# Patient Record
Sex: MALE | Race: WHITE | NOT HISPANIC OR LATINO | Employment: FULL TIME | URBAN - METROPOLITAN AREA
[De-identification: names, ages, dates, MRNs, and addresses within clinical notes are randomized per-mention and may not be internally consistent; named-entity substitution may affect disease eponyms.]

---

## 2017-08-10 ENCOUNTER — TRANSCRIBE ORDERS (OUTPATIENT)
Dept: SLEEP CENTER | Facility: CLINIC | Age: 57
End: 2017-08-10

## 2017-08-10 ENCOUNTER — HOSPITAL ENCOUNTER (OUTPATIENT)
Dept: SLEEP CENTER | Facility: CLINIC | Age: 57
Discharge: HOME | End: 2017-08-10
Payer: COMMERCIAL

## 2017-08-10 DIAGNOSIS — G47.33 OSA (OBSTRUCTIVE SLEEP APNEA): Primary | ICD-10-CM

## 2017-08-10 DIAGNOSIS — G47.33 OSA (OBSTRUCTIVE SLEEP APNEA): ICD-10-CM

## 2018-08-14 ENCOUNTER — OFFICE VISIT (OUTPATIENT)
Dept: SLEEP CENTER | Facility: CLINIC | Age: 58
End: 2018-08-14
Payer: COMMERCIAL

## 2018-08-14 VITALS
BODY MASS INDEX: 38.51 KG/M2 | HEART RATE: 72 BPM | HEIGHT: 70 IN | SYSTOLIC BLOOD PRESSURE: 120 MMHG | DIASTOLIC BLOOD PRESSURE: 82 MMHG | WEIGHT: 269 LBS

## 2018-08-14 DIAGNOSIS — G47.33 OSA (OBSTRUCTIVE SLEEP APNEA): ICD-10-CM

## 2018-08-14 PROCEDURE — 99214 OFFICE O/P EST MOD 30 MIN: CPT | Performed by: INTERNAL MEDICINE

## 2018-08-14 RX ORDER — LOSARTAN POTASSIUM 50 MG/1
100 TABLET ORAL ONCE
COMMUNITY
Start: 2018-07-15

## 2018-08-14 RX ORDER — OMEPRAZOLE 40 MG/1
CAPSULE, DELAYED RELEASE ORAL
Refills: 3 | COMMUNITY
Start: 2018-07-23

## 2018-08-14 RX ORDER — BUPROPION HYDROCHLORIDE 300 MG/1
TABLET ORAL
COMMUNITY

## 2018-08-14 RX ORDER — VENLAFAXINE HYDROCHLORIDE 75 MG/1
CAPSULE, EXTENDED RELEASE ORAL
COMMUNITY

## 2018-08-14 RX ORDER — VENLAFAXINE HYDROCHLORIDE 37.5 MG/1
CAPSULE, EXTENDED RELEASE ORAL
COMMUNITY
Start: 2018-01-15

## 2018-08-14 RX ORDER — CABERGOLINE 0.5 MG/1
TABLET ORAL
COMMUNITY

## 2018-08-14 NOTE — PROGRESS NOTES
Progress Note - Sleep Center   Helen Rose IVW:[birth year] MRN: [id number]      Reason for Visit:  59 y o male here for annual follow-up    Assessment:  Doing well on current therapy of BiPAP 18/14 cm for very severe MARINA (AHI = 85)  The patient has been snoring on his current setting and is machine is not functioning properly  His humidifier does not work at all  Plan:  Retitration study, followed by new BiPAP prescription for a new machine  Follow up:  Compliance check    History of Present Illness:  History of MARINA on PAP therapy  Fully compliant and deriving benefit  Historical Information    Past Medical History: History reviewed  No pertinent past medical history  Past Surgical History: History reviewed  No pertinent surgical history  Social History:   Social History     Social History    Marital status: /Civil Union     Spouse name: N/A    Number of children: N/A    Years of education: N/A     Social History Main Topics    Smoking status: Never Smoker    Smokeless tobacco: Never Used    Alcohol use Yes    Drug use: No    Sexual activity: Not Asked     Other Topics Concern    None     Social History Narrative    None       Family History: History reviewed  No pertinent family history      Medications/Allergies:      Current Outpatient Prescriptions:     buPROPion (WELLBUTRIN XL) 300 mg 24 hr tablet, Take by mouth, Disp: , Rfl:     cabergoline (DOSTINEX) 0 5 MG tablet, cabergoline 0 5 mg tablet, Disp: , Rfl:     losartan (COZAAR) 50 mg tablet, , Disp: , Rfl:     omeprazole (PriLOSEC) 40 MG capsule, TAKE 1 CAPSULE BY MOUTH EVERY DAY, 30 MINUTES BEFORE EATING/DRINKING IN MORNING, Disp: , Rfl: 3    venlafaxine (EFFEXOR-XR) 37 5 mg 24 hr capsule, , Disp: , Rfl:     venlafaxine (EFFEXOR-XR) 75 mg 24 hr capsule, venlafaxine ER 75 mg capsule,extended release 24 hr, Disp: , Rfl:         Review of Systems      Genitourinary difficulty with erection   Cardiology none Gastrointestinal none   Neurology numbness/tingling of an extremity and balance problems   Constitutional weight change   Integumentary none   Psychiatry aggressiveness or irritability   Musculoskeletal none   Pulmonary frequent cough and snoring   ENT throat clearing   Endocrine excessive thirst and frequent urination   Hematological none           Objective      Vital Signs:   Vitals:    08/14/18 0800   BP: 120/82   Pulse: 72     [place] Sleepiness Scale: Total score: 7        Physical Exam:    General: Alert, appropriate, cooperative, overweight    Head: NC/AT    Skin: Warm, dry    Neuro: No motor abnormalities, cranial nerves appear intact    Extremity: No clubbing, cyanosis      DME Provider: [facility]    Counseling / Coordination of Care  Total clinic time spent today 15 minutes  Greater than 50% of total time was spent with the patient and / or family counseling and / or coordination of care  A description of the counseling / coordination of care: Discussed equipment and response to treatment  DANIAL Jarvis    Board Certified Sleep Specialist

## 2018-09-14 ENCOUNTER — HOSPITAL ENCOUNTER (OUTPATIENT)
Dept: SLEEP CENTER | Facility: CLINIC | Age: 58
Discharge: HOME | End: 2018-09-14
Payer: COMMERCIAL

## 2018-09-14 DIAGNOSIS — G47.33 OSA (OBSTRUCTIVE SLEEP APNEA): ICD-10-CM

## 2018-09-14 PROCEDURE — 95811 POLYSOM 6/>YRS CPAP 4/> PARM: CPT

## 2018-09-15 NOTE — PROGRESS NOTES
Sleep Study Documentation    Pre-Sleep Study       Sleep testing procedure explained to patient:YES    Patient napped prior to study:YES- less than 30 minutes  Napped after 2PM: yes    Caffeine:Dayshift worker after 12PM   Caffeine use:NO    Alcohol:Dayshift workers after 5PM: Alcohol use:NO    Typical day for patient:YES       Study Documentation  Treatment   Optimal PAP pressure: 20/93XHW1F  Leak:Small  Snore:Eliminated  REM Obtained:yes  Supplemental O2: no    Minimum SaO2 88%  Baseline SaO2 97%  PAP mask tried (list all)[employer]AR[employer] DREAM WEAR  PAP mask choice (final)LAR[employer] DREAM WEAR  PAP mask type:full face  PAP pressure at which snoring was eliminated 16/85HMO2K  Minimum SaO2 at final PAP pressure 95%  Mode of Therapy:BiPAP  ETCO2:Yes  If yes comment on data AS PER ORDER  CPAP changed to BiPAP:No    Mode of Therapy:BiPAP    EKG abnormalities: no     EEG abnormalities: no    Study Terminated:no    Patient classification: employed       Post-Sleep Study    Medication used at bedtime or during sleep study:NO    Patient reports time it took to fall asleep:less than 20 minutes    Patient reports waking up during study:3 or more times  Patient reports returning to sleep without difficulty  Patient reports sleeping 4 to 6 hours without dreaming  Patient reports sleep during study:typical    Patient rated sleepiness: Somewhat sleepy or tired    PAP treatment:yes: Post PAP treatment patient reports feeling unchanged and would wear PAP mask at home

## 2018-09-17 DIAGNOSIS — G47.33 OSA (OBSTRUCTIVE SLEEP APNEA): Primary | ICD-10-CM

## 2018-09-20 ENCOUNTER — TELEPHONE (OUTPATIENT)
Dept: SLEEP CENTER | Facility: CLINIC | Age: 58
End: 2018-09-20

## 2018-09-20 NOTE — TELEPHONE ENCOUNTER
Spoke with patient regarding BiPap pressure of 20/16  Patient needs new machine and uses [facility] as his provider  Rx and data to [facility]   They will call and schedule set up once approved by insurance  Patient will call if any problems

## 2018-09-28 DIAGNOSIS — G47.33 OSA (OBSTRUCTIVE SLEEP APNEA): Primary | ICD-10-CM

## 2018-10-12 DIAGNOSIS — G47.33 OSA (OBSTRUCTIVE SLEEP APNEA): Primary | ICD-10-CM

## 2018-10-15 ENCOUNTER — TELEPHONE (OUTPATIENT)
Dept: SLEEP CENTER | Facility: CLINIC | Age: 58
End: 2018-10-15

## 2018-10-15 NOTE — TELEPHONE ENCOUNTER
I spoke with pt, he states he did not stop his BIPAP prior to his home study scheduled for [facility]  I advised that he will need to stop BIPAP for 3 days prior to home study  Pt states he did have a diagnostic sleep study done in 1998 at [facility]  I advised if we could get that report that we might not need him to get a home study  Advised I will try to get the report and CB  I also sent a message to Dr Bere Kyle to see if he still wants him to get a home sleep study if we can get his report  Will await his response

## 2018-10-16 NOTE — TELEPHONE ENCOUNTER
DR Oscar Clifton aware of study from 2000  Ok to try to get authorization for new machine  Report given to [facility]     I spoke with pt, made aware that we have report, will be sent to insurance to see if we can get auth, as he might not need another sleep study  Advised will keep him updated  Pt thankful

## 2018-10-22 ENCOUNTER — TRANSCRIBE ORDERS (OUTPATIENT)
Dept: SLEEP CENTER | Facility: CLINIC | Age: 58
End: 2018-10-22

## 2018-12-23 ENCOUNTER — HOSPITAL ENCOUNTER (EMERGENCY)
Facility: HOSPITAL | Age: 58
Discharge: HOME | End: 2018-12-23
Attending: EMERGENCY MEDICINE | Admitting: EMERGENCY MEDICINE
Payer: COMMERCIAL

## 2018-12-23 VITALS
WEIGHT: 265 LBS | DIASTOLIC BLOOD PRESSURE: 86 MMHG | SYSTOLIC BLOOD PRESSURE: 147 MMHG | HEART RATE: 73 BPM | RESPIRATION RATE: 18 BRPM | BODY MASS INDEX: 38.02 KG/M2 | OXYGEN SATURATION: 96 % | TEMPERATURE: 98.1 F

## 2018-12-23 DIAGNOSIS — S61.219A FINGER LACERATION: Primary | ICD-10-CM

## 2018-12-23 PROCEDURE — 90715 TDAP VACCINE 7 YRS/> IM: CPT | Performed by: EMERGENCY MEDICINE

## 2018-12-23 PROCEDURE — 99283 EMERGENCY DEPT VISIT LOW MDM: CPT

## 2018-12-23 PROCEDURE — 90471 IMMUNIZATION ADMIN: CPT

## 2018-12-23 RX ADMIN — TETANUS TOXOID, REDUCED DIPHTHERIA TOXOID AND ACELLULAR PERTUSSIS VACCINE, ADSORBED 0.5 ML: 5; 2.5; 8; 8; 2.5 SUSPENSION INTRAMUSCULAR at 11:32

## 2018-12-23 NOTE — DISCHARGE INSTRUCTIONS
Finger Laceration   WHAT YOU NEED TO KNOW:   A finger laceration is a deep cut in your skin  It is often caused by a sharp object, such as a knife, or blunt force to your finger  Your blood vessels, bones, joints, tendons, or nerves may also be injured  DISCHARGE INSTRUCTIONS:   Return to the emergency department if:   · Your wound comes apart  · Blood soaks through your bandage  · You have severe pain in your finger or hand  · Your finger is pale and cold  · You have sudden trouble moving your finger  · Your swelling suddenly gets worse  · You have red streaks on your skin coming from your wound  Contact your healthcare provider or hand specialist if:   · You have new numbness or tingling  · Your finger feels warm, looks swollen or red, and is draining pus  · You have a fever  · You have questions or concerns about your condition or care  Medicines: You may  need any of the following:  · Antibiotics  help prevent a bacterial infection  · Acetaminophen  decreases pain and fever  It is available without a doctor's order  Ask how much to take and how often to take it  Follow directions  Read the labels of all other medicines you are using to see if they also contain acetaminophen, or ask your doctor or pharmacist  Acetaminophen can cause liver damage if not taken correctly  Do not use more than 4 grams (4,000 milligrams) total of acetaminophen in one day  · Prescription pain medicine  may be given  Ask your healthcare provider how to take this medicine safely  Some prescription pain medicines contain acetaminophen  Do not take other medicines that contain acetaminophen without talking to your healthcare provider  Too much acetaminophen may cause liver damage  Prescription pain medicine may cause constipation  Ask your healthcare provider how to prevent or treat constipation  · Take your medicine as directed    Contact your healthcare provider if you think your medicine is not helping or if you have side effects  Tell him or her if you are allergic to any medicine  Keep a list of the medicines, vitamins, and herbs you take  Include the amounts, and when and why you take them  Bring the list or the pill bottles to follow-up visits  Carry your medicine list with you in case of an emergency  Self-care:   · Apply ice  on your finger for 15 to 20 minutes every hour or as directed  Use an ice pack, or put crushed ice in a plastic bag  Cover it with a towel before you apply it to your skin  Ice helps prevent tissue damage and decreases swelling and pain  · Elevate  your hand above the level of your heart as often as you can  This will help decrease swelling and pain  Prop your hand on pillows or blankets to keep it elevated comfortably  · Wear your splint as directed  A splint will decrease movement and stress on your wound  The splint may help your wound heal faster  Ask your healthcare provider how to apply and remove a splint  · Apply ointments to decrease scarring  Do not apply ointments until your healthcare provider says it is okay  You may need to wait until your wound is healed  Ask which ointment to buy and how often to use it  Wound care:   · Do not get your wound wet until your healthcare provider says it is okay  Do not soak your hand in water  Do not go swimming until your healthcare provider says it is okay  When your healthcare provider says it is okay, carefully wash around the wound with soap and water  Let soap and water run over your wound  Gently pat the area dry or allow it to air dry  · Change your bandages when they get wet, dirty, or after washing  Apply new, clean bandages as directed  Do not apply elastic bandages or tape too tightly  Do not put powders or lotions on your wound  · Apply antibiotic ointment as directed  Your healthcare provider may give you antibiotic ointment to put over your wound if you have stitches   If you have Strips-Strips over your wound, let them dry up and fall off on their own  If they do not fall off within 14 days, gently remove them  If you have glue over your wound, do not remove or pick at it  If your glue comes off, do not replace it with glue that you have at home  · Check your wound every day for signs of infection  Signs of infection include swelling, redness, or pus  Follow up with your healthcare provider or hand specialist in 2 days:  Write down your questions so you remember to ask them during your visits  © 2017 [employer]  Information is for End User's use only and may not be sold, redistributed or otherwise used for commercial purposes  All illustrations and images included in CareNotes® are the copyrighted property of [employer]  or [employer] Santo  The above information is an  only  It is not intended as medical advice for individual conditions or treatments  Talk to your doctor, nurse or pharmacist before following any medical regimen to see if it is safe and effective for you

## 2018-12-23 NOTE — ED PROVIDER NOTES
History  Chief Complaint   Patient presents with    Finger Laceration     pt reports trying to open a heavy zip tie with a utility knife and has lac to left 2nd digit  not UTD on tetanus  13-year-old male presents today after sustaining a laceration to his left index finger while trying to cut a zip tie with a utility knife  Denies other injury  Is not on any blood thinners  History provided by:  Patient  Hand Injury   Location:  Finger  Finger location:  L index finger  Injury: no    Pain details:     Severity:  No pain  Handedness:  Right-handed  Dislocation: no    Foreign body present:  No foreign bodies  Tetanus status:  Out of date  Prior injury to area:  No  Relieved by:  None tried  Ineffective treatments:  None tried  Associated symptoms: no fever    Risk factors: no concern for non-accidental trauma and no recent illness        Prior to Admission Medications   Prescriptions Last Dose Informant Patient Reported? Taking? buPROPion (WELLBUTRIN XL) 300 mg 24 hr tablet   Yes No   Sig: Take by mouth   cabergoline (DOSTINEX) 0 5 MG tablet   Yes No   Sig: cabergoline 0 5 mg tablet   losartan (COZAAR) 50 mg tablet   Yes No   omeprazole (PriLOSEC) 40 MG capsule   Yes No   Sig: TAKE 1 CAPSULE BY MOUTH EVERY DAY, 30 MINUTES BEFORE EATING/DRINKING IN MORNING   venlafaxine (EFFEXOR-XR) 37 5 mg 24 hr capsule   Yes No   venlafaxine (EFFEXOR-XR) 75 mg 24 hr capsule   Yes No   Sig: venlafaxine ER 75 mg capsule,extended release 24 hr      Facility-Administered Medications: None       Past Medical History:   Diagnosis Date    GERD (gastroesophageal reflux disease)     Hypertension     Psychiatric disorder     Sleep apnea        Past Surgical History:   Procedure Laterality Date    CATARACT EXTRACTION      RETINAL DETACHMENT SURGERY         History reviewed  No pertinent family history  I have reviewed and agree with the history as documented      Social History   Substance Use Topics    Smoking status: Never Smoker    Smokeless tobacco: Never Used    Alcohol use Yes        Review of Systems   Constitutional: Negative for chills and fever  Skin: Positive for wound  Negative for pallor and rash  Allergic/Immunologic: Negative for immunocompromised state  Hematological: Does not bruise/bleed easily  Psychiatric/Behavioral: Negative for confusion  Physical Exam  Physical Exam   Constitutional: He is oriented to person, place, and time  He appears well-developed and well-nourished  HENT:   Head: Normocephalic and atraumatic  Musculoskeletal: Normal range of motion  Hands:  Small 1 cm lac on the dorsal left MCP  Shallow  No tendon involvement  NV intact distally  Neurological: He is alert and oriented to person, place, and time  Skin: Skin is warm and dry  Capillary refill takes less than 2 seconds  Psychiatric: He has a normal mood and affect  Nursing note and vitals reviewed  Vital Signs  ED Triage Vitals [12/23/18 1108]   Temperature Pulse Respirations Blood Pressure SpO2   98 1 °F (36 7 °C) 73 18 147/86 96 %      Temp Source Heart Rate Source Patient Position - Orthostatic VS BP Location FiO2 (%)   Oral Monitor -- -- --      Pain Score       2           Vitals:    12/23/18 1108   BP: 147/86   Pulse: 73       Visual Acuity      ED Medications  Medications   tetanus-diphtheria-acellular pertussis (BOOSTRIX) IM injection 0 5 mL (0 5 mL Intramuscular Given 12/23/18 1132)       Diagnostic Studies  Results Reviewed     None                 No orders to display              Procedures  Lac Repair  Date/Time: 12/23/2018 1:03 PM  Performed by: Josefina Garrido by: Jen Kwon   Consent: Verbal consent obtained    Patient understanding: patient states understanding of the procedure being performed  Required items: required blood products, implants, devices, and special equipment available  Patient identity confirmed: verbally with patient  Body area: upper extremity  Location details: left index finger  Laceration length: 1 cm  Foreign bodies: no foreign bodies  Tendon involvement: none  Nerve involvement: none  Vascular damage: no    Sedation:  Patient sedated: no    Wound Dehiscence:  Superficial Wound Dehiscence: simple closure      Procedure Details:  Irrigation solution: tap water and saline  Irrigation method: tap  Amount of cleaning: standard  Debridement: none  Skin closure: glue and Steri-Strips  Approximation: close  Dressing: splint  Patient tolerance: Patient tolerated the procedure well with no immediate complications             Phone Contacts  ED Phone Contact    ED Course                               MDM  Number of Diagnoses or Management Options  Finger laceration: minor  Diagnosis management comments: Aluminum splint applied by me to keep his finger straight  Discussed wound care precautions  Patient is stable for discharge  Return to ED instructions reviewed  Risk of Complications, Morbidity, and/or Mortality  Presenting problems: low  Diagnostic procedures: low  Management options: low    Patient Progress  Patient progress: stable    CritCare Time    Disposition  Final diagnoses:   Finger laceration     Time reflects when diagnosis was documented in both MDM as applicable and the Disposition within this note     Time User Action Codes Description Comment    12/23/2018 11:46 AM Inocencia Figueredo Add [[id number]] Finger laceration       ED Disposition     ED Disposition Condition Comment    Discharge  [facility] discharge to home/self care      Condition at discharge: Stable        Follow-up Information     Follow up With Specialties Details Why Hugh Baeza MD Internal Medicine Schedule an appointment as soon as possible for a visit  [address] [place] [address] Jairo Thao  [phone]            Discharge Medication List as of 12/23/2018 11:46 AM      CONTINUE these medications which have NOT CHANGED    Details buPROPion (WELLBUTRIN XL) 300 mg 24 hr tablet Take by mouth, Historical Med      cabergoline (DOSTINEX) 0 5 MG tablet cabergoline 0 5 mg tablet, Historical Med      losartan (COZAAR) 50 mg tablet Starting Sun 7/15/2018, Historical Med      omeprazole (PriLOSEC) 40 MG capsule TAKE 1 CAPSULE BY MOUTH EVERY DAY, 30 MINUTES BEFORE EATING/DRINKING IN MORNING, Historical Med      !! venlafaxine (EFFEXOR-XR) 37 5 mg 24 hr capsule Starting Mon 1/15/2018, Historical Med      !! venlafaxine (EFFEXOR-XR) 75 mg 24 hr capsule venlafaxine ER 75 mg capsule,extended release 24 hr, Historical Med       !! - Potential duplicate medications found  Please discuss with provider  No discharge procedures on file      ED Provider  Electronically Signed by           Saúl Hernandez DO  12/23/18 4268

## 2019-01-08 ENCOUNTER — OFFICE VISIT (OUTPATIENT)
Dept: SLEEP CENTER | Facility: CLINIC | Age: 59
End: 2019-01-08
Payer: COMMERCIAL

## 2019-01-08 VITALS
BODY MASS INDEX: 38.65 KG/M2 | DIASTOLIC BLOOD PRESSURE: 80 MMHG | HEART RATE: 103 BPM | HEIGHT: 70 IN | WEIGHT: 270 LBS | SYSTOLIC BLOOD PRESSURE: 130 MMHG

## 2019-01-08 DIAGNOSIS — G47.33 OSA (OBSTRUCTIVE SLEEP APNEA): Primary | ICD-10-CM

## 2019-01-08 PROCEDURE — 99213 OFFICE O/P EST LOW 20 MIN: CPT | Performed by: INTERNAL MEDICINE

## 2019-01-08 NOTE — PROGRESS NOTES
Progress Note - Sleep Center   Florentino Johnston TVW:[birth year] MRN: [id number]      Reason for Visit:  59 y o male here for annual follow-up    Assessment:  Doing well on current therapy of BPAP 20/16 for severe obstructive sleep apnea (AHI = 85)  Plan:  Continue same    Follow up: One year    History of Present Illness:  History of MARINA on PAP therapy  Fully compliant and deriving benefit  Historical Information    Past Medical History:   Past Medical History:   Diagnosis Date    GERD (gastroesophageal reflux disease)     Hypertension     Psychiatric disorder     Sleep apnea          Past Surgical History:   Past Surgical History:   Procedure Laterality Date    CATARACT EXTRACTION      RETINAL DETACHMENT SURGERY         Social History:   Social History     Social History    Marital status: /Civil Union     Spouse name: N/A    Number of children: N/A    Years of education: N/A     Social History Main Topics    Smoking status: Never Smoker    Smokeless tobacco: Never Used    Alcohol use Yes      Comment: Occasional    Drug use: No    Sexual activity: Not Asked     Other Topics Concern    None     Social History Narrative    None       Family History: History reviewed  No pertinent family history      Medications/Allergies:      Current Outpatient Prescriptions:     buPROPion (WELLBUTRIN XL) 300 mg 24 hr tablet, Take by mouth, Disp: , Rfl:     cabergoline (DOSTINEX) 0 5 MG tablet, cabergoline 0 5 mg tablet, Disp: , Rfl:     losartan (COZAAR) 50 mg tablet, , Disp: , Rfl:     omeprazole (PriLOSEC) 40 MG capsule, TAKE 1 CAPSULE BY MOUTH EVERY DAY, 30 MINUTES BEFORE EATING/DRINKING IN MORNING, Disp: , Rfl: 3    venlafaxine (EFFEXOR-XR) 37 5 mg 24 hr capsule, , Disp: , Rfl:     venlafaxine (EFFEXOR-XR) 75 mg 24 hr capsule, venlafaxine ER 75 mg capsule,extended release 24 hr, Disp: , Rfl:         Review of Systems      Genitourinary none   Cardiology none   Gastrointestinal none   Neurology none   Constitutional none   Integumentary none   Psychiatry none   Musculoskeletal muscle aches and back pain   Pulmonary frequent cough and snoring   ENT none   Endocrine none   Hematological none         Objective      Vital Signs:   Vitals:    01/08/19 1500   BP: 130/80   Pulse: 103     [place] Sleepiness Scale: Total score: 5        Physical Exam:    General: Alert, appropriate, cooperative, overweight    Head: NC/AT    Skin: Warm, dry    Neuro: No motor abnormalities, cranial nerves appear intact    Extremity: No clubbing, cyanosis      DME Provider: Rylee Garay    I have reviewed and updated the review of systems as necessary    Counseling / Coordination of Care   I have spent 20 minutes with Patient  today in which greater than 50% of this time was spent in counseling/coordination of care regarding Intructions for management                Board Certified Sleep Specialist

## 2020-01-08 ENCOUNTER — OFFICE VISIT (OUTPATIENT)
Dept: SLEEP CENTER | Facility: CLINIC | Age: 60
End: 2020-01-08
Payer: COMMERCIAL

## 2020-01-08 VITALS
SYSTOLIC BLOOD PRESSURE: 120 MMHG | DIASTOLIC BLOOD PRESSURE: 68 MMHG | BODY MASS INDEX: 39.65 KG/M2 | HEIGHT: 70 IN | HEART RATE: 88 BPM | WEIGHT: 277 LBS

## 2020-01-08 DIAGNOSIS — G47.33 OSA (OBSTRUCTIVE SLEEP APNEA): Primary | ICD-10-CM

## 2020-01-08 PROCEDURE — 99213 OFFICE O/P EST LOW 20 MIN: CPT | Performed by: INTERNAL MEDICINE

## 2020-01-08 NOTE — PROGRESS NOTES
Progress Note - Sleep Center   Micki Ashley CNK:[birth year] MRN: [id number]      Reason for Visit:  62 y o male here for annual follow-up    Assessment:  Doing well on current therapy of BiPAP 20/16 cm for very severe MARINA (AHI = 85 0)  Plan:  Continue same    Follow up: One year    History of Present Illness:  History of MARINA on PAP therapy  Fully compliant and deriving benefit      Review of Systems      Genitourinary none   Cardiology none   Gastrointestinal none   Neurology numbness/tingling of an extremity   Constitutional none   Integumentary none   Psychiatry depression   Musculoskeletal muscle aches and back pain   Pulmonary frequent cough and snoring   ENT throat clearing   Endocrine frequent urination   Hematological none           I have reviewed and updated the review of systems as necessary      Historical Information    Past Medical History:   Past Medical History:   Diagnosis Date    GERD (gastroesophageal reflux disease)     Hypertension     Psychiatric disorder     Sleep apnea          Past Surgical History:   Past Surgical History:   Procedure Laterality Date    CATARACT EXTRACTION      RETINAL DETACHMENT SURGERY         Social History:   Social History     Socioeconomic History    Marital status: /Civil Union     Spouse name: None    Number of children: None    Years of education: None    Highest education level: None   Occupational History    None   Social Needs    Financial resource strain: None    Food insecurity:     Worry: None     Inability: None    Transportation needs:     Medical: None     Non-medical: None   Tobacco Use    Smoking status: Never Smoker    Smokeless tobacco: Never Used   Substance and Sexual Activity    Alcohol use: Yes     Comment: Occasional    Drug use: No    Sexual activity: None   Lifestyle    Physical activity:     Days per week: None     Minutes per session: None    Stress: None   Relationships    Social connections:     Talks on phone: None     Gets together: None     Attends [employer] service: None     Active member of club or organization: None     Attends meetings of clubs or organizations: None     Relationship status: None    Intimate partner violence:     Fear of current or ex partner: None     Emotionally abused: None     Physically abused: None     Forced sexual activity: None   Other Topics Concern    None   Social History Narrative    None       Family History: History reviewed  No pertinent family history  Medications/Allergies:      Current Outpatient Medications:     buPROPion (WELLBUTRIN XL) 300 mg 24 hr tablet, Take by mouth, Disp: , Rfl:     cabergoline (DOSTINEX) 0 5 MG tablet, cabergoline 0 5 mg tablet, Disp: , Rfl:     losartan (COZAAR) 50 mg tablet, , Disp: , Rfl:     omeprazole (PriLOSEC) 40 MG capsule, TAKE 1 CAPSULE BY MOUTH EVERY DAY, 30 MINUTES BEFORE EATING/DRINKING IN MORNING, Disp: , Rfl: 3    venlafaxine (EFFEXOR-XR) 37 5 mg 24 hr capsule, , Disp: , Rfl:     venlafaxine (EFFEXOR-XR) 75 mg 24 hr capsule, venlafaxine ER 75 mg capsule,extended release 24 hr, Disp: , Rfl:           Objective      Vital Signs:   Vitals:    01/08/20 1500   BP: 120/68   Pulse: 88     [place] Sleepiness Scale: Total score: 6        Physical Exam:    General: Alert, appropriate, cooperative, overweight    Head: NC/AT    Skin: Warm, dry    Neuro: No motor abnormalities, cranial nerves appear intact    Extremity: No clubbing, cyanosis      DME Provider: [employer]        Counseling / Coordination of Care   I have spent 15 minutes with the patient today in which greater than 50% of this time was spent in counseling/coordination of care regarding: equipment and compliance  Board Certified Sleep Specialist    Portions of the record may have been created with voice recognition software    Occasional wrong word or "sound a like" substitutions may have occurred due to the inherent limitations of voice recognition software  Read the chart carefully and recognize, using context, where substitutions have occurred

## 2020-06-24 ENCOUNTER — PREPPED CHART (OUTPATIENT)
Dept: URBAN - METROPOLITAN AREA CLINIC 6 | Facility: CLINIC | Age: 60
End: 2020-06-24

## 2021-01-08 ENCOUNTER — OFFICE VISIT (OUTPATIENT)
Dept: SLEEP CENTER | Facility: CLINIC | Age: 61
End: 2021-01-08
Payer: COMMERCIAL

## 2021-01-08 VITALS
SYSTOLIC BLOOD PRESSURE: 140 MMHG | HEART RATE: 72 BPM | DIASTOLIC BLOOD PRESSURE: 82 MMHG | BODY MASS INDEX: 39.22 KG/M2 | WEIGHT: 274 LBS | HEIGHT: 70 IN

## 2021-01-08 DIAGNOSIS — G47.33 OSA (OBSTRUCTIVE SLEEP APNEA): Primary | ICD-10-CM

## 2021-01-08 PROCEDURE — 99213 OFFICE O/P EST LOW 20 MIN: CPT | Performed by: INTERNAL MEDICINE

## 2021-01-08 NOTE — PROGRESS NOTES
Progress Note - Sleep Center   Checo Ojeda K:[birth year] MRN: [id number]      Reason for Visit:  56 y o male here for annual follow-up    Assessment:  Doing well on current therapy of BiPAP 20/16 cm for severe MARINA (AHI = 85 0)  Plan:  Continue same    Follow up: One year    History of Present Illness:  History of MARINA on PAP therapy  Fully compliant and deriving benefit      Review of Systems      Genitourinary none   Cardiology none   Gastrointestinal none   Neurology none   Constitutional none   Integumentary none   Psychiatry none   Musculoskeletal joint pain   Pulmonary shortness of breath with activity   ENT none   Endocrine frequent urination   Hematological none         I have reviewed and updated the review of systems as necessary      Historical Information    Past Medical History:   Past Medical History:   Diagnosis Date    GERD (gastroesophageal reflux disease)     Hypertension     Psychiatric disorder     Sleep apnea          Past Surgical History:   Past Surgical History:   Procedure Laterality Date    CATARACT EXTRACTION      RETINAL DETACHMENT SURGERY         Social History:   Social History     Socioeconomic History    Marital status: /Civil Union     Spouse name: None    Number of children: None    Years of education: None    Highest education level: None   Occupational History    None   Social Needs    Financial resource strain: None    Food insecurity     Worry: None     Inability: None    Transportation needs     Medical: None     Non-medical: None   Tobacco Use    Smoking status: Never Smoker    Smokeless tobacco: Never Used   Substance and Sexual Activity    Alcohol use: Yes     Comment: Occasional    Drug use: No    Sexual activity: None   Lifestyle    Physical activity     Days per week: None     Minutes per session: None    Stress: None   Relationships    Social connections     Talks on phone: None     Gets together: None     Attends [employer] service: None Active member of club or organization: None     Attends meetings of clubs or organizations: None     Relationship status: None    Intimate partner violence     Fear of current or ex partner: None     Emotionally abused: None     Physically abused: None     Forced sexual activity: None   Other Topics Concern    None   Social History Narrative    None       Family History: No family history on file  Medications/Allergies:      Current Outpatient Medications:     buPROPion (WELLBUTRIN XL) 300 mg 24 hr tablet, Take by mouth, Disp: , Rfl:     cabergoline (DOSTINEX) 0 5 MG tablet, cabergoline 0 5 mg tablet, Disp: , Rfl:     losartan (COZAAR) 50 mg tablet, , Disp: , Rfl:     metFORMIN (GLUCOPHAGE) 500 mg tablet, Daily, Disp: , Rfl:     omeprazole (PriLOSEC) 40 MG capsule, TAKE 1 CAPSULE BY MOUTH EVERY DAY, 30 MINUTES BEFORE EATING/DRINKING IN MORNING, Disp: , Rfl: 3    venlafaxine (EFFEXOR-XR) 37 5 mg 24 hr capsule, , Disp: , Rfl:     venlafaxine (EFFEXOR-XR) 75 mg 24 hr capsule, venlafaxine ER 75 mg capsule,extended release 24 hr, Disp: , Rfl:           Objective      Vital Signs:   Vitals:    01/08/21 1500   BP: 140/82   Pulse: 72     [place] Sleepiness Scale: Total score: 8        Physical Exam:    General: Alert, appropriate, cooperative, overweight    Head: NC/AT    Skin: Warm, dry    Neuro: No motor abnormalities, cranial nerves appear intact    Extremity: No clubbing, cyanosis      DME Provider: [employer] [employer]        Counseling / Coordination of Care   I have spent 10 minutes with the patient today in which greater than 50% of this time was spent in counseling/coordination of care regarding: equipment and compliance  Board Certified Sleep Specialist    Portions of the record may have been created with voice recognition software  Occasional wrong word or "sound a like" substitutions may have occurred due to the inherent limitations of voice recognition software    Read the chart carefully and recognize, using context, where substitutions have occurred

## 2021-03-10 DIAGNOSIS — Z23 ENCOUNTER FOR IMMUNIZATION: ICD-10-CM

## 2022-01-12 ENCOUNTER — OFFICE VISIT (OUTPATIENT)
Dept: SLEEP CENTER | Facility: CLINIC | Age: 62
End: 2022-01-12
Payer: COMMERCIAL

## 2022-01-12 VITALS
SYSTOLIC BLOOD PRESSURE: 150 MMHG | HEIGHT: 70 IN | DIASTOLIC BLOOD PRESSURE: 80 MMHG | WEIGHT: 277.8 LBS | BODY MASS INDEX: 39.77 KG/M2

## 2022-01-12 DIAGNOSIS — G47.33 OSA (OBSTRUCTIVE SLEEP APNEA): Primary | ICD-10-CM

## 2022-01-12 PROCEDURE — 99213 OFFICE O/P EST LOW 20 MIN: CPT | Performed by: INTERNAL MEDICINE

## 2022-01-12 RX ORDER — CYANOCOBALAMIN (VITAMIN B-12) 500 MCG
TABLET ORAL
COMMUNITY

## 2022-01-12 RX ORDER — FENOFIBRATE 145 MG/1
145 TABLET, COATED ORAL DAILY
COMMUNITY

## 2022-01-12 RX ORDER — AMLODIPINE BESYLATE AND ATORVASTATIN CALCIUM 5; 10 MG/1; MG/1
1 TABLET, FILM COATED ORAL DAILY
COMMUNITY

## 2022-01-12 RX ORDER — CHLORAL HYDRATE 500 MG
CAPSULE ORAL
COMMUNITY

## 2022-01-12 RX ORDER — UBIDECARENONE 100 MG
1000 CAPSULE ORAL DAILY
COMMUNITY

## 2022-01-12 NOTE — PROGRESS NOTES
Progress Note - Sleep Center   Philmore Babinski UTM:[birth year] MRN: [id number]      Reason for Visit:  59 y o male here for annual follow-up    Assessment:  Doing well on current therapy of BiPAP 20/16 cm for AHI = 85 0  Plan:  Continue same    Follow up: One year    History of Present Illness:  History of MARINA on PAP therapy  Fully compliant and deriving benefit      Review of Systems      Genitourinary none   Cardiology none   Gastrointestinal none   Neurology none   Constitutional none   Integumentary none   Psychiatry depression   Musculoskeletal joint pain and back pain   Pulmonary none   ENT none   Endocrine frequent urination   Hematological none           I have reviewed and updated the review of systems as necessary      Historical Information    Past Medical History:   Past Medical History:   Diagnosis Date    GERD (gastroesophageal reflux disease)     Hypertension     Psychiatric disorder     Sleep apnea          Past Surgical History:   Past Surgical History:   Procedure Laterality Date    CATARACT EXTRACTION      RETINAL DETACHMENT SURGERY         Social History:   Social History     Socioeconomic History    Marital status: /Civil Union     Spouse name: Not on file    Number of children: Not on file    Years of education: Not on file    Highest education level: Not on file   Occupational History    Not on file   Tobacco Use    Smoking status: Never Smoker    Smokeless tobacco: Never Used   Substance and Sexual Activity    Alcohol use: Yes     Comment: Occasional    Drug use: No    Sexual activity: Not on file   Other Topics Concern    Not on file   Social History Narrative    Not on file     Social Determinants of Health     Financial Resource Strain: Not on file   Food Insecurity: Not on file   Transportation Needs: Not on file   Physical Activity: Not on file   Stress: Not on file   Social Connections: Not on file   Intimate Partner Violence: Not on file   Housing Stability: Not on file       Family History: No family history on file  Medications/Allergies:      Current Outpatient Medications:     Alpha-Lipoic Acid 100 MG CAPS, alpha lipoic acid, Disp: , Rfl:     amLODIPine-atorvastatin (CADUET) 5-10 MG per tablet, Take 1 tablet by mouth daily 5mg, Disp: , Rfl:     buPROPion (WELLBUTRIN XL) 300 mg 24 hr tablet, Take by mouth, Disp: , Rfl:     cabergoline (DOSTINEX) 0 5 MG tablet, cabergoline 0 5 mg tablet, Disp: , Rfl:     Cholecalciferol (D3-1000) 25 MCG (1000 UT) capsule, Take 1,000 Units by mouth daily, Disp: , Rfl:     fenofibrate (TRICOR) 145 mg tablet, Take 145 mg by mouth daily, Disp: , Rfl:     Folic Acid 0 8 MG CAPS, folic acid, Disp: , Rfl:     losartan (COZAAR) 50 mg tablet, 100 mg 1 (one) time  , Disp: , Rfl:     metFORMIN (GLUCOPHAGE) 500 mg tablet, Daily, Disp: , Rfl:     Misc Natural Products (OSTEO BI-FLEX/5-LOXIN ADVANCED PO), Osteo Bi-Flex, Disp: , Rfl:     Multiple Vitamins-Minerals (MULTIVITAMIN ADULT EXTRA C PO), Multivitamin 50 Plus, Disp: , Rfl:     Omega-3 Fatty Acids (fish oil) 1,000 mg, Fish Oil 1,000 mg (120 mg-180 mg) capsule, Disp: , Rfl:     venlafaxine (EFFEXOR-XR) 37 5 mg 24 hr capsule, , Disp: , Rfl:     venlafaxine (EFFEXOR-XR) 75 mg 24 hr capsule, venlafaxine ER 75 mg capsule,extended release 24 hr, Disp: , Rfl:     omeprazole (PriLOSEC) 40 MG capsule, TAKE 1 CAPSULE BY MOUTH EVERY DAY, 30 MINUTES BEFORE EATING/DRINKING IN MORNING (Patient not taking: Reported on 1/12/2022), Disp: , Rfl: 3          Objective      Vital Signs:   Vitals:    01/12/22 1509   BP: 150/80     [place] Sleepiness Scale: Total score: 6        Physical Exam:    General: Alert, appropriate, cooperative, overweight    Head: NC/AT    Skin: Warm, dry    Neuro: No motor abnormalities, cranial nerves appear intact    Extremity: No clubbing, cyanosis      DME Provider: [employer]  The patient has a new machine provided through the recall          Counseling / Coordination of Care   I have spent 15 minutes with the patient today in which greater than 50% of this time was spent in counseling/coordination of care regarding: equipment and compliance  Board Certified Sleep Specialist    Portions of the record may have been created with voice recognition software  Occasional wrong word or "sound a like" substitutions may have occurred due to the inherent limitations of voice recognition software  Read the chart carefully and recognize, using context, where substitutions have occurred

## 2022-09-09 ENCOUNTER — ESTABLISHED COMPREHENSIVE EXAM (OUTPATIENT)
Dept: URBAN - METROPOLITAN AREA CLINIC 6 | Facility: CLINIC | Age: 62
End: 2022-09-09

## 2022-09-09 DIAGNOSIS — H43.392: ICD-10-CM

## 2022-09-09 DIAGNOSIS — Z98.890: ICD-10-CM

## 2022-09-09 DIAGNOSIS — H35.372: ICD-10-CM

## 2022-09-09 DIAGNOSIS — Z96.1: ICD-10-CM

## 2022-09-09 DIAGNOSIS — E11.9: ICD-10-CM

## 2022-09-09 PROCEDURE — 92014 COMPRE OPH EXAM EST PT 1/>: CPT

## 2022-09-09 PROCEDURE — 92015 DETERMINE REFRACTIVE STATE: CPT

## 2022-09-09 PROCEDURE — 92250 FUNDUS PHOTOGRAPHY W/I&R: CPT

## 2022-09-09 ASSESSMENT — VISUAL ACUITY
OS_CC: 20/30-1
OU_CC: J1+
OD_CC: 20/30-2

## 2022-09-09 ASSESSMENT — TONOMETRY
OD_IOP_MMHG: 18
OS_IOP_MMHG: 24

## 2023-01-12 ENCOUNTER — OFFICE VISIT (OUTPATIENT)
Dept: SLEEP CENTER | Facility: CLINIC | Age: 63
End: 2023-01-12

## 2023-01-12 VITALS
HEIGHT: 70 IN | SYSTOLIC BLOOD PRESSURE: 162 MMHG | BODY MASS INDEX: 41.57 KG/M2 | HEART RATE: 98 BPM | DIASTOLIC BLOOD PRESSURE: 77 MMHG | WEIGHT: 290.4 LBS

## 2023-01-12 DIAGNOSIS — G47.33 OSA (OBSTRUCTIVE SLEEP APNEA): Primary | ICD-10-CM

## 2023-01-12 NOTE — PROGRESS NOTES
Progress Note - Sleep Center   Justin Kiser KXS:[birth year] MRN: [id number]      Reason for Visit:  57 y o male here for annual follow-up    Assessment:  Doing well on current therapy of BPAP 20/16 cm for Severe MARINA ( AHI= 85)   Plan:  Continue same    Follow up: One year    History of Present Illness:  History of MARINA on PAP therapy  Fully compliant and deriving benefit  Historical Information    Past Medical History:   Past Medical History:   Diagnosis Date   • GERD (gastroesophageal reflux disease)    • Hypertension    • Psychiatric disorder    • Sleep apnea          Past Surgical History:   Past Surgical History:   Procedure Laterality Date   • CATARACT EXTRACTION     • RETINAL DETACHMENT SURGERY         Social History:   Social History     Socioeconomic History   • Marital status: /Civil Union     Spouse name: None   • Number of children: None   • Years of education: None   • Highest education level: None   Occupational History   • None   Tobacco Use   • Smoking status: Never   • Smokeless tobacco: Never   Substance and Sexual Activity   • Alcohol use: Yes     Comment: Occasional   • Drug use: No   • Sexual activity: None   Other Topics Concern   • None   Social History Narrative   • None     Social Determinants of Health     Financial Resource Strain: Not on file   Food Insecurity: Not on file   Transportation Needs: Not on file   Physical Activity: Not on file   Stress: Not on file   Social Connections: Not on file   Intimate Partner Violence: Not on file   Housing Stability: Not on file       Family History: No family history on file      Medications/Allergies:      Current Outpatient Medications:   •  Alpha-Lipoic Acid 100 MG CAPS, alpha lipoic acid, Disp: , Rfl:   •  amLODIPine-atorvastatin (CADUET) 5-10 MG per tablet, Take 1 tablet by mouth daily 5mg, Disp: , Rfl:   •  buPROPion (WELLBUTRIN XL) 300 mg 24 hr tablet, Take by mouth, Disp: , Rfl:   •  cabergoline (DOSTINEX) 0 5 MG tablet, cabergoline 0 5 mg tablet, Disp: , Rfl:   •  Cholecalciferol (D3-1000) 25 MCG (1000 UT) capsule, Take 1,000 Units by mouth daily, Disp: , Rfl:   •  Folic Acid 0 8 MG CAPS, folic acid, Disp: , Rfl:   •  metFORMIN (GLUCOPHAGE) 500 mg tablet, Daily, Disp: , Rfl:   •  Multiple Vitamins-Minerals (MULTIVITAMIN ADULT EXTRA C PO), Multivitamin 50 Plus, Disp: , Rfl:   •  Omega-3 Fatty Acids (fish oil) 1,000 mg, Fish Oil 1,000 mg (120 mg-180 mg) capsule, Disp: , Rfl:   •  venlafaxine (EFFEXOR-XR) 37 5 mg 24 hr capsule, , Disp: , Rfl:   •  venlafaxine (EFFEXOR-XR) 75 mg 24 hr capsule, venlafaxine ER 75 mg capsule,extended release 24 hr, Disp: , Rfl:   •  fenofibrate (TRICOR) 145 mg tablet, Take 145 mg by mouth daily, Disp: , Rfl:   •  losartan (COZAAR) 50 mg tablet, 100 mg 1 (one) time  , Disp: , Rfl:   •  Misc Natural Products (OSTEO BI-FLEX/5-LOXIN ADVANCED PO), Osteo Bi-Flex, Disp: , Rfl:   •  omeprazole (PriLOSEC) 40 MG capsule, TAKE 1 CAPSULE BY MOUTH EVERY DAY, 30 MINUTES BEFORE EATING/DRINKING IN MORNING (Patient not taking: Reported on 1/12/2022), Disp: , Rfl: 3          Objective      Vital Signs:   Vitals:    01/12/23 1459   BP: 162/77   Pulse: 98     [place] Sleepiness Scale: Total score: 6        Physical Exam:    General: Alert, appropriate, cooperative, overweight    Head: NC/AT    Skin: Warm, dry    Neuro: No motor abnormalities, cranial nerves appear intact    Extremity: No clubbing, cyanosis      DME Provider: [facility]        Counseling / Coordination of Care   I have spent 15 minutes with the patient today in which greater than 50% of this time was spent in counseling/coordination of care regarding: equipment and compliance  Board Certified Sleep Specialist    Portions of the record may have been created with voice recognition software  Occasional wrong word or "sound a like" substitutions may have occurred due to the inherent limitations of voice recognition software    Read the chart carefully and recognize, using context, where substitutions have occurred

## 2023-01-13 ENCOUNTER — TELEPHONE (OUTPATIENT)
Dept: SLEEP CENTER | Facility: CLINIC | Age: 63
End: 2023-01-13

## 2023-01-16 LAB

## 2023-09-08 ENCOUNTER — ESTABLISHED COMPREHENSIVE EXAM (OUTPATIENT)
Dept: URBAN - METROPOLITAN AREA CLINIC 6 | Facility: CLINIC | Age: 63
End: 2023-09-08

## 2023-09-08 DIAGNOSIS — Z96.1: ICD-10-CM

## 2023-09-08 DIAGNOSIS — H04.123: ICD-10-CM

## 2023-09-08 DIAGNOSIS — H02.836: ICD-10-CM

## 2023-09-08 DIAGNOSIS — H43.393: ICD-10-CM

## 2023-09-08 DIAGNOSIS — E11.9: ICD-10-CM

## 2023-09-08 DIAGNOSIS — H35.372: ICD-10-CM

## 2023-09-08 DIAGNOSIS — H02.833: ICD-10-CM

## 2023-09-08 PROCEDURE — 92134 CPTRZ OPH DX IMG PST SGM RTA: CPT

## 2023-09-08 PROCEDURE — 92014 COMPRE OPH EXAM EST PT 1/>: CPT

## 2023-09-08 ASSESSMENT — TONOMETRY
OD_IOP_MMHG: 16
OS_IOP_MMHG: 23

## 2023-09-08 ASSESSMENT — VISUAL ACUITY
OS_CC: 20/30
OU_CC: J1
OD_CC: 20/30